# Patient Record
Sex: FEMALE | Race: WHITE | NOT HISPANIC OR LATINO | URBAN - METROPOLITAN AREA
[De-identification: names, ages, dates, MRNs, and addresses within clinical notes are randomized per-mention and may not be internally consistent; named-entity substitution may affect disease eponyms.]

---

## 2019-02-08 ENCOUNTER — OFFICE VISIT (OUTPATIENT)
Dept: URGENT CARE | Facility: CLINIC | Age: 39
End: 2019-02-08
Payer: COMMERCIAL

## 2019-02-08 ENCOUNTER — APPOINTMENT (OUTPATIENT)
Dept: RADIOLOGY | Facility: CLINIC | Age: 39
End: 2019-02-08
Payer: COMMERCIAL

## 2019-02-08 VITALS
DIASTOLIC BLOOD PRESSURE: 78 MMHG | OXYGEN SATURATION: 97 % | WEIGHT: 180 LBS | RESPIRATION RATE: 16 BRPM | HEIGHT: 66 IN | BODY MASS INDEX: 28.93 KG/M2 | SYSTOLIC BLOOD PRESSURE: 132 MMHG | TEMPERATURE: 98.2 F | HEART RATE: 56 BPM
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
## 2019-02-08 DIAGNOSIS — M54.2 NECK PAIN: ICD-10-CM

## 2019-02-08 DIAGNOSIS — M54.2 NECK PAIN: Primary | ICD-10-CM

## 2019-02-08 PROCEDURE — G0382 LEV 3 HOSP TYPE B ED VISIT: HCPCS | Performed by: NURSE PRACTITIONER

## 2019-02-08 PROCEDURE — 72040 X-RAY EXAM NECK SPINE 2-3 VW: CPT

## 2019-02-08 RX ORDER — CYCLOBENZAPRINE HCL 5 MG
5 TABLET ORAL 3 TIMES DAILY PRN
Qty: 30 TABLET | Refills: 0 | Status: SHIPPED | OUTPATIENT
Start: 2019-02-08
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
## 2019-02-08 NOTE — PATIENT INSTRUCTIONS
The xray of your cervical spine looks normal   It does look like your neck is rigid and spasm  We will have the radiologist review the film as well  You may be sore for the next few days  Rest   Take ibuprofen 400 mg to 600 mg every 6 hours as needed for pain  Apply heat to your neck 20 minutes on, 20 minutes off  Follow up with your doctor if your symptoms do not resolve  Take a muscle relaxant as needed for muscle spasm  Do not drive while taking

## 2021-04-08 DIAGNOSIS — Z23 ENCOUNTER FOR IMMUNIZATION: ICD-10-CM

## 2022-03-21 ENCOUNTER — TELEPHONE (OUTPATIENT)
Dept: GASTROENTEROLOGY | Facility: CLINIC | Age: 42
End: 2022-03-21

## 2022-03-21 NOTE — TELEPHONE ENCOUNTER
Pt's , Yina Jacobo, states she is in daily pain; has NP appt 4/11 and is on cancellation list but has daily stomach pain  Primary was going to order CT scan but was denied/asks if GI can order? I noted she would need to be seen first before ordering testing/maybe could get in sooner  # [phone]

## 2022-03-21 NOTE — TELEPHONE ENCOUNTER
Pt's  called back, discussed different options and then an apt opened up for tomorrow  He said would take it and was very grateful  Scheduled with Dr Nela Sabillon at 4:30

## 2022-03-22 ENCOUNTER — TELEPHONE (OUTPATIENT)
Dept: GASTROENTEROLOGY | Facility: CLINIC | Age: 42
End: 2022-03-22

## 2022-03-22 ENCOUNTER — OFFICE VISIT (OUTPATIENT)
Dept: GASTROENTEROLOGY | Facility: CLINIC | Age: 42
End: 2022-03-22
Payer: COMMERCIAL

## 2022-03-22 VITALS
WEIGHT: 180 LBS | HEIGHT: 66 IN | SYSTOLIC BLOOD PRESSURE: 122 MMHG | DIASTOLIC BLOOD PRESSURE: 74 MMHG | BODY MASS INDEX: 28.93 KG/M2

## 2022-03-22 DIAGNOSIS — Z12.11 SCREENING FOR COLON CANCER: ICD-10-CM

## 2022-03-22 DIAGNOSIS — R11.0 NAUSEA: ICD-10-CM

## 2022-03-22 DIAGNOSIS — R10.33 PERIUMBILICAL ABDOMINAL PAIN: ICD-10-CM

## 2022-03-22 DIAGNOSIS — K62.5 RECTAL BLEEDING: Primary | ICD-10-CM

## 2022-03-22 DIAGNOSIS — R10.13 EPIGASTRIC PAIN: ICD-10-CM

## 2022-03-22 DIAGNOSIS — R19.7 DIARRHEA, UNSPECIFIED TYPE: ICD-10-CM

## 2022-03-22 PROCEDURE — 99204 OFFICE O/P NEW MOD 45 MIN: CPT | Performed by: INTERNAL MEDICINE

## 2022-03-22 NOTE — LETTER
March 22, 2022     Ailin Vance MD  [place]    Patient: Manuel Tejeda   YOB: 1980   Date of Visit: 3/22/2022       Dear Dr Maximus Haider: Thank you for referring Manuel Tejeda to me for evaluation  Below are my notes for this consultation  If you have questions, please do not hesitate to call me  I look forward to following your patient along with you  Sincerely,        Marcel Keller MD        CC: No Recipients  Marcel Keller MD  3/22/2022  5:36 PM  Sign when Signing Visit    [facility] - Outpatient Consultation  Manuel Tejeda 43 y o  female MRN: [id number]  Encounter: [id number]    ASSESSMENT AND PLAN:      1  Rectal bleeding  2  Diarrhea, unspecified type  - Clostridium difficile toxin by PCR with EIA; Future  - Giardia antigen; Future  - H  pylori antigen, stool; Future  - Ova and parasite examination; Future  - MISCELLANEOUS LAB TEST; Future  - FECAL LEUKOCYTES; Future  - Fecal fat, qualitative; Future  3  Epigastric pain  4  Periumbilical abdominal pain  5  Nausea  Multitude of GI symptoms including severe periumbilical abdominal pain watery stools and episodes of bloody diarrhea could be secondary to chronic infection, inflammatory bowel disease, given history enteric endometriosis is a consideration  May also have intra-abdominal adhesions contributing to pain and bloating, doubt ischemic bowel  · Diet as tolerated, observe for and avoid any trigger foods including fatty foods caffeine alcohol and mushrooms  · Stool studies as outlined  · Plan EGD and colonoscopy  · If stools negative and endoscopic evaluation nondiagnostic will need small bowel imaging such as MR enterography to assess for Crohn's and possibly endometrial implants    6  Screening for colon cancer  Average risk  Future recommendations for colorectal cancer screening pending results colonoscopy planned above        Followup Appointment: About 2 months pending results of stools and endoscopic evaluation  ______________________________________________________________________    Chief Complaint   Patient presents with    Abdominal Pain     referred by pcp    Rectal Bleeding     with diarrhea gotten worse in the last 30 days    Dizziness       HPI:   Antoine Mora is a 43y o  year old female who presents with bloody stools and abdominal pain  Started with vertigo last December  Last month started having lower back pains and diarrhea  Intermittent  Sometimes constipated  Had a large bloody stool  Has had endometriosis of the bowel  Not vaginal bleeding  Multiple large bloody  And mucus stools  No  Just diarrhea, not bloody  Sometimes just loose, sometimes no stool for 3-4 days and then has hard stool  Has low back pain that radiates to the left abdomen  Kristel Nissen Has bloating and gassiness  Increased anxiety with work past three months  Two months ago, cut out caffeine, was big coffee drinker  Was drinking mushroom water  Now gets intermittent periumbilical crampy sharp abdominal pain  Epigastric pain with nausea, then urgency and has loose to watery stools  After defecation, pain worse in periumbilical area  No fever  No vomiting  Was taking naproxen in past, years ago  Uses medical marijuana which has eliminated need for pain medications and anxiety  No change in medical marijuana regimen over past three years  No recent antibiotics  Historical Information   Past Medical History:   Diagnosis Date    Endometriosis     PTSD (post-traumatic stress disorder)      History reviewed  No pertinent surgical history    Social History     Substance and Sexual Activity   Alcohol Use None    Comment: rarely     Social History     Substance and Sexual Activity   Drug Use Not on file     Social History     Tobacco Use   Smoking Status Never Smoker   Smokeless Tobacco Never Used     Family History   Problem Relation Age of Onset    Hypertension Mother     Colon cancer Neg Hx     Colon polyps Neg Hx     Inflammatory bowel disease Neg Hx     Celiac disease Neg Hx        Meds/Allergies     Current Outpatient Medications:     NON FORMULARY    cyclobenzaprine (FLEXERIL) 5 mg tablet    Allergies   Allergen Reactions    Cephalexin Abdominal Pain and Vomiting       PHYSICAL EXAM:    Blood pressure 122/74, height 5' 6" (1 676 m), weight 81 6 kg (180 lb)  Body mass index is 29 05 kg/m²  General Appearance: NAD, cooperative, alert  Eyes: Anicteric, PERRLA, EOMI  ENT:  Normocephalic, atraumatic, normal mucosa  Neck:  Supple, symmetrical, trachea midline,   Resp:  Clear to auscultation bilaterally; no rales, rhonchi or wheezing; respirations unlabored   CV:  S1 S2, Regular rate and rhythm; no murmur, rub, or gallop  GI:  Soft, mild periumbilical and right lower quadrant tenderness without rebound or guarding, non-distended; normal bowel sounds; no masses, no organomegaly   Rectal: Deferred  Musculoskeletal: No cyanosis, clubbing or edema  Normal ROM  Skin:  No jaundice, rashes, or lesions   Heme/Lymph: No palpable cervical lymphadenopathy  Psych: Normal affect, good eye contact  Neuro: No gross deficits, AAOx3    Lab Results:   CMP, amylase and lipase normal  TSH normal  CBC normal  IBD panel normal  Celiac serologies negative  ESR normal    Radiology Results:   No results found  REVIEW OF SYSTEMS:    CONSTITUTIONAL: Denies any fever, chills, rigors, and weight loss  HEENT: No earache or tinnitus  Denies hearing loss or visual disturbances  CARDIOVASCULAR: No chest pain or palpitations  RESPIRATORY: Denies any cough, hemoptysis, shortness of breath or dyspnea on exertion  GASTROINTESTINAL: As noted in the History of Present Illness  GENITOURINARY: No problems with urination  Denies any hematuria or dysuria  NEUROLOGIC: No dizziness or vertigo, denies headaches  MUSCULOSKELETAL: Denies any muscle or joint pain     SKIN: Denies skin rashes or itching  ENDOCRINE: Denies excessive thirst  Denies intolerance to heat or cold  PSYCHOSOCIAL: Denies depression or anxiety  Denies any recent memory loss

## 2022-03-22 NOTE — PATIENT INSTRUCTIONS
Diet as tolerated, observe for and avoid any trigger foods including fatty foods caffeine alcohol and mushrooms  Check stool studies  Schedule EGD and colonoscopy

## 2022-03-22 NOTE — PROGRESS NOTES
[facility] - Outpatient Consultation  Nolberto Barraza 43 y o  female MRN: [id number]  Encounter: [id number]    ASSESSMENT AND PLAN:      1  Rectal bleeding  2  Diarrhea, unspecified type  - Clostridium difficile toxin by PCR with EIA; Future  - Giardia antigen; Future  - H  pylori antigen, stool; Future  - Ova and parasite examination; Future  - MISCELLANEOUS LAB TEST; Future  - FECAL LEUKOCYTES; Future  - Fecal fat, qualitative; Future  3  Epigastric pain  4  Periumbilical abdominal pain  5  Nausea  Multitude of GI symptoms including severe periumbilical abdominal pain watery stools and episodes of bloody diarrhea could be secondary to chronic infection, inflammatory bowel disease, given history enteric endometriosis is a consideration  May also have intra-abdominal adhesions contributing to pain and bloating, doubt ischemic bowel  · Diet as tolerated, observe for and avoid any trigger foods including fatty foods caffeine alcohol and mushrooms  · Stool studies as outlined  · Plan EGD and colonoscopy  · If stools negative and endoscopic evaluation nondiagnostic will need small bowel imaging such as MR enterography to assess for Crohn's and possibly endometrial implants    6  Screening for colon cancer  Average risk  Future recommendations for colorectal cancer screening pending results colonoscopy planned above  Followup Appointment:  About 2 months pending results of stools and endoscopic evaluation  ______________________________________________________________________    Chief Complaint   Patient presents with    Abdominal Pain     referred by pcp    Rectal Bleeding     with diarrhea gotten worse in the last 30 days    Dizziness       HPI:   Nolberto Barraza is a 43y o  year old female who presents with bloody stools and abdominal pain  Started with vertigo last December  Last month started having lower back pains and diarrhea  Intermittent    Sometimes constipated  Had a large bloody stool  Has had endometriosis of the bowel  Not vaginal bleeding  Multiple large bloody  And mucus stools  No  Just diarrhea, not bloody  Sometimes just loose, sometimes no stool for 3-4 days and then has hard stool  Has low back pain that radiates to the left abdomen  Willy Reeves Has bloating and gassiness  Increased anxiety with work past three months  Two months ago, cut out caffeine, was big coffee drinker  Was drinking mushroom water  Now gets intermittent periumbilical crampy sharp abdominal pain  Epigastric pain with nausea, then urgency and has loose to watery stools  After defecation, pain worse in periumbilical area  No fever  No vomiting  Was taking naproxen in past, years ago  Uses medical marijuana which has eliminated need for pain medications and anxiety  No change in medical marijuana regimen over past three years  No recent antibiotics  Historical Information   Past Medical History:   Diagnosis Date    Endometriosis     PTSD (post-traumatic stress disorder)      History reviewed  No pertinent surgical history  Social History     Substance and Sexual Activity   Alcohol Use None    Comment: rarely     Social History     Substance and Sexual Activity   Drug Use Not on file     Social History     Tobacco Use   Smoking Status Never Smoker   Smokeless Tobacco Never Used     Family History   Problem Relation Age of Onset    Hypertension Mother     Colon cancer Neg Hx     Colon polyps Neg Hx     Inflammatory bowel disease Neg Hx     Celiac disease Neg Hx        Meds/Allergies     Current Outpatient Medications:     NON FORMULARY    cyclobenzaprine (FLEXERIL) 5 mg tablet    Allergies   Allergen Reactions    Cephalexin Abdominal Pain and Vomiting       PHYSICAL EXAM:    Blood pressure 122/74, height 5' 6" (1 676 m), weight 81 6 kg (180 lb)  Body mass index is 29 05 kg/m²    General Appearance: NAD, cooperative, alert  Eyes: Anicteric, PERRLA, EOMI  ENT: Normocephalic, atraumatic, normal mucosa  Neck:  Supple, symmetrical, trachea midline,   Resp:  Clear to auscultation bilaterally; no rales, rhonchi or wheezing; respirations unlabored   CV:  S1 S2, Regular rate and rhythm; no murmur, rub, or gallop  GI:  Soft, mild periumbilical and right lower quadrant tenderness without rebound or guarding, non-distended; normal bowel sounds; no masses, no organomegaly   Rectal: Deferred  Musculoskeletal: No cyanosis, clubbing or edema  Normal ROM  Skin:  No jaundice, rashes, or lesions   Heme/Lymph: No palpable cervical lymphadenopathy  Psych: Normal affect, good eye contact  Neuro: No gross deficits, AAOx3    Lab Results:   CMP, amylase and lipase normal  TSH normal  CBC normal  IBD panel normal  Celiac serologies negative  ESR normal    Radiology Results:   No results found  REVIEW OF SYSTEMS:    CONSTITUTIONAL: Denies any fever, chills, rigors, and weight loss  HEENT: No earache or tinnitus  Denies hearing loss or visual disturbances  CARDIOVASCULAR: No chest pain or palpitations  RESPIRATORY: Denies any cough, hemoptysis, shortness of breath or dyspnea on exertion  GASTROINTESTINAL: As noted in the History of Present Illness  GENITOURINARY: No problems with urination  Denies any hematuria or dysuria  NEUROLOGIC: No dizziness or vertigo, denies headaches  MUSCULOSKELETAL: Denies any muscle or joint pain  SKIN: Denies skin rashes or itching  ENDOCRINE: Denies excessive thirst  Denies intolerance to heat or cold  PSYCHOSOCIAL: Denies depression or anxiety  Denies any recent memory loss

## 2022-03-22 NOTE — TELEPHONE ENCOUNTER
Pt with scheduled for combo 5/9/22 with Dr Rigoberto Marcelino in Endo   Stool studies were ordered  jg

## 2022-03-23 DIAGNOSIS — K62.5 RECTAL BLEEDING: Primary | ICD-10-CM

## 2022-03-23 NOTE — TELEPHONE ENCOUNTER
Scheduled date of colonoscopy (as of today):5/9/22  Physician performing colonoscopy:Dr Axel Suarez  Location of colonoscopy:Endo  Bowel prep reviewed with patient:Clenpiq  Instructions reviewed with patient by: Darren Jay  Clearances: No

## 2022-03-24 RX ORDER — SODIUM PICOSULFATE, MAGNESIUM OXIDE, AND ANHYDROUS CITRIC ACID 10; 3.5; 12 MG/160ML; G/160ML; G/160ML
LIQUID ORAL
Qty: 320 ML | Refills: 0 | Status: SHIPPED | OUTPATIENT
Start: 2022-03-24

## 2022-04-20 NOTE — TELEPHONE ENCOUNTER
Spoke to patient  All of her symptoms resolved  No pain, no diarrhea, no vertigo, etc  She also stopped the mushrooms and is wondering if had something to do with symptoms  She states if colonoscopy still recommended she would proceed but asked that I check with KK

## 2023-01-27 ENCOUNTER — TELEPHONE (OUTPATIENT)
Dept: GASTROENTEROLOGY | Facility: CLINIC | Age: 43
End: 2023-01-27

## 2023-01-27 ENCOUNTER — OFFICE VISIT (OUTPATIENT)
Dept: GASTROENTEROLOGY | Facility: CLINIC | Age: 43
End: 2023-01-27

## 2023-01-27 VITALS
HEIGHT: 66 IN | DIASTOLIC BLOOD PRESSURE: 70 MMHG | WEIGHT: 181 LBS | BODY MASS INDEX: 29.09 KG/M2 | SYSTOLIC BLOOD PRESSURE: 112 MMHG

## 2023-01-27 DIAGNOSIS — K62.5 RECTAL BLEEDING: Primary | ICD-10-CM

## 2023-01-27 DIAGNOSIS — R19.7 DIARRHEA, UNSPECIFIED TYPE: ICD-10-CM

## 2023-01-27 DIAGNOSIS — R10.84 GENERALIZED ABDOMINAL PAIN: ICD-10-CM

## 2023-01-27 DIAGNOSIS — N80.9 ENDOMETRIOSIS: ICD-10-CM

## 2023-01-27 RX ORDER — ELAGOLIX 150 MG/1
TABLET, FILM COATED ORAL
COMMUNITY

## 2023-01-27 NOTE — PROGRESS NOTES
[facility] - Outpatient Follow-up Note  Antonio Campos 37 y o  female MRN: [id number]  Encounter: [id number]    ASSESSMENT AND PLAN:      1  Rectal bleeding  2  Diarrhea, unspecified type  3  Endometriosis  4  Generalized abdominal pain  Ongoing issues with stool frequency, tenesmus, and occasional rectal bleeding  History of endometriosis and going further work-up with her gynecologist   Some improvement in pain with Orilissa, but stool abnormalities persist     Discussed treatment options  We will plan EGD/colonoscopy to assess for endometrial implants, as well as to assess for alternate diagnoses like celiac disease, inflammatory bowel disease, or microscopic colitis  In the interim, okay to use IBgard for symptomatic relief of abdominal pain  - EGD; Future  - Colonoscopy; Future      Followup Appointment: Pending EGD/colonoscopy  ______________________________________________________________________    Chief Complaint   Patient presents with   • referred by OB/gyn for GI evaluation     HPI: The patient presents for follow-up on multiple GI complaints  She was seen by Dr Anish Olsen last year with similar complaints that improved with some dietary changes  Lately she has been undergoing GYN work-up for known history of endometriosis that may be progressing  She was started on Orilissa with some improvement in pain but not in other GI complaints  The eventual plan includes possible hysterectomy  She was diagnosed with endometriosis in 2006 when she had surgery for an ovarian cyst   Bowel involvement was reportedly noted  She had a colonoscopy around then but results are not available to me  She had a son in 2007 and was relatively asymptomatic for 2 or 3 years  She then had a daughter about 8 years ago and had relief for a few years but she has been gradually getting worse    From the GI standpoint she has generalized abdominal discomfort, frequent stools with incomplete evacuation  She has multiple trips to the bathroom for stools  Bouts could last for 5 days  She occasionally sees blood  With the new medication she is had less GI pain but the other symptoms persist   She has occasional nausea that is associated with vertigo  She has not tried antidiarrheals  She is use Gas-X as needed for bloating  She uses Pepto for nausea    Historical Information   Past Medical History:   Diagnosis Date   • Endometriosis    • PTSD (post-traumatic stress disorder)      No past surgical history on file  Social History     Substance and Sexual Activity   Alcohol Use None    Comment: rarely     Social History     Substance and Sexual Activity   Drug Use Not on file     Social History     Tobacco Use   Smoking Status Never   Smokeless Tobacco Never     Family History   Problem Relation Age of Onset   • Hypertension Mother    • Colon cancer Neg Hx    • Colon polyps Neg Hx    • Inflammatory bowel disease Neg Hx    • Celiac disease Neg Hx          Current Outpatient Medications:   •  Elagolix Sodium (Orilissa) 150 MG TABS  •  NON FORMULARY  •  cyclobenzaprine (FLEXERIL) 5 mg tablet  •  Sod Picosulfate-Mag Ox-Cit Acd (Clenpiq) 10-3 5-12 MG-GM -GM/160ML SOLN  Allergies   Allergen Reactions   • Cephalexin Abdominal Pain and Vomiting     Reviewed medications and allergies and updated as indicated    PHYSICAL EXAM:    Blood pressure 112/70, height 5' 6" (1 676 m), weight 82 1 kg (181 lb)  Body mass index is 29 21 kg/m²  General Appearance: NAD, cooperative, alert  Eyes: Anicteric, PERRLA, EOMI  ENT:  Normocephalic, atraumatic, normal mucosa  Neck:  Supple, symmetrical, trachea midline  Resp:  Clear to auscultation bilaterally; no rales, rhonchi or wheezing; respirations unlabored   CV:  S1 S2, Regular rate and rhythm; no murmur, rub, or gallop    GI:  Soft, non-tender, non-distended; normal bowel sounds; no masses, no organomegaly   Rectal: Deferred  Musculoskeletal: No cyanosis, clubbing or edema  Normal ROM  Skin:  No jaundice, rashes, or lesions   Heme/Lymph: No palpable cervical lymphadenopathy  Psych: Normal affect, good eye contact  Neuro: No gross deficits, AAOx3    Lab Results:   No results found for: WBC, HGB, HCT, MCV, PLT  No results found for: NA, K, CL, CO2, ANIONGAP, BUN, CREATININE, GLUCOSE, GLUF, CALCIUM, CORRECTEDCA, AST, ALT, ALKPHOS, PROT, BILITOT, EGFR  No results found for: IRON, TIBC, FERRITIN  No results found for: LIPASE    Radiology Results:   No results found

## 2023-01-27 NOTE — H&P (VIEW-ONLY)
[id number] - Outpatient Follow-up Note  Ilana Monroe 37 y o  female MRN: [id number]  Encounter: [id number]    ASSESSMENT AND PLAN:      1  Rectal bleeding  2  Diarrhea, unspecified type  3  Endometriosis  4  Generalized abdominal pain  Ongoing issues with stool frequency, tenesmus, and occasional rectal bleeding  History of endometriosis and going further work-up with her gynecologist   Some improvement in pain with Orilissa, but stool abnormalities persist     Discussed treatment options  We will plan EGD/colonoscopy to assess for endometrial implants, as well as to assess for alternate diagnoses like celiac disease, inflammatory bowel disease, or microscopic colitis  In the interim, okay to use IBgard for symptomatic relief of abdominal pain  - EGD; Future  - Colonoscopy; Future      Followup Appointment: Pending EGD/colonoscopy  ______________________________________________________________________    Chief Complaint   Patient presents with   • referred by OB/gyn for GI evaluation     HPI: The patient presents for follow-up on multiple GI complaints  She was seen by Dr Vilma Jacobs last year with similar complaints that improved with some dietary changes  Lately she has been undergoing GYN work-up for known history of endometriosis that may be progressing  She was started on Orilissa with some improvement in pain but not in other GI complaints  The eventual plan includes possible hysterectomy  She was diagnosed with endometriosis in 2006 when she had surgery for an ovarian cyst   Bowel involvement was reportedly noted  She had a colonoscopy around then but results are not available to me  She had a son in 2007 and was relatively asymptomatic for 2 or 3 years  She then had a daughter about 8 years ago and had relief for a few years but she has been gradually getting worse    From the GI standpoint she has generalized abdominal discomfort, frequent stools with incomplete evacuation  She has multiple trips to the bathroom for stools  Bouts could last for 5 days  She occasionally sees blood  With the new medication she is had less GI pain but the other symptoms persist   She has occasional nausea that is associated with vertigo  She has not tried antidiarrheals  She is use Gas-X as needed for bloating  She uses Pepto for nausea    Historical Information   Past Medical History:   Diagnosis Date   • Endometriosis    • PTSD (post-traumatic stress disorder)      No past surgical history on file  Social History     Substance and Sexual Activity   Alcohol Use None    Comment: rarely     Social History     Substance and Sexual Activity   Drug Use Not on file     Social History     Tobacco Use   Smoking Status Never   Smokeless Tobacco Never     Family History   Problem Relation Age of Onset   • Hypertension Mother    • Colon cancer Neg Hx    • Colon polyps Neg Hx    • Inflammatory bowel disease Neg Hx    • Celiac disease Neg Hx          Current Outpatient Medications:   •  Elagolix Sodium (Orilissa) 150 MG TABS  •  NON FORMULARY  •  cyclobenzaprine (FLEXERIL) 5 mg tablet  •  Sod Picosulfate-Mag Ox-Cit Acd (Clenpiq) 10-3 5-12 MG-GM -GM/160ML SOLN  Allergies   Allergen Reactions   • Cephalexin Abdominal Pain and Vomiting     Reviewed medications and allergies and updated as indicated    PHYSICAL EXAM:    Blood pressure 112/70, height 5' 6" (1 676 m), weight 82 1 kg (181 lb)  Body mass index is 29 21 kg/m²  General Appearance: NAD, cooperative, alert  Eyes: Anicteric, PERRLA, EOMI  ENT:  Normocephalic, atraumatic, normal mucosa  Neck:  Supple, symmetrical, trachea midline  Resp:  Clear to auscultation bilaterally; no rales, rhonchi or wheezing; respirations unlabored   CV:  S1 S2, Regular rate and rhythm; no murmur, rub, or gallop    GI:  Soft, non-tender, non-distended; normal bowel sounds; no masses, no organomegaly   Rectal: Deferred  Musculoskeletal: No cyanosis, clubbing or edema  Normal ROM  Skin:  No jaundice, rashes, or lesions   Heme/Lymph: No palpable cervical lymphadenopathy  Psych: Normal affect, good eye contact  Neuro: No gross deficits, AAOx3    Lab Results:   No results found for: WBC, HGB, HCT, MCV, PLT  No results found for: NA, K, CL, CO2, ANIONGAP, BUN, CREATININE, GLUCOSE, GLUF, CALCIUM, CORRECTEDCA, AST, ALT, ALKPHOS, PROT, BILITOT, EGFR  No results found for: IRON, TIBC, FERRITIN  No results found for: LIPASE    Radiology Results:   No results found

## 2023-01-27 NOTE — TELEPHONE ENCOUNTER
Scheduled date of EGD/colonoscopy (as of today):2/9/23  Physician performing EGD/colonoscopy: Dr Manzo Linear  Location of EGD/colonoscopy: [facility] ([facility])  Desired bowel prep reviewed with patient: Miralax/dulcolax  Instructions reviewed with patient by: gave patient packet  Clearances: N

## 2023-02-09 ENCOUNTER — ANESTHESIA (OUTPATIENT)
Dept: GASTROENTEROLOGY | Facility: AMBULATORY SURGERY CENTER | Age: 43
End: 2023-02-09

## 2023-02-09 ENCOUNTER — ANESTHESIA EVENT (OUTPATIENT)
Dept: GASTROENTEROLOGY | Facility: AMBULATORY SURGERY CENTER | Age: 43
End: 2023-02-09

## 2023-02-09 ENCOUNTER — HOSPITAL ENCOUNTER (OUTPATIENT)
Dept: GASTROENTEROLOGY | Facility: AMBULATORY SURGERY CENTER | Age: 43
Discharge: HOME | End: 2023-02-09

## 2023-02-09 VITALS
BODY MASS INDEX: 28.45 KG/M2 | HEART RATE: 58 BPM | RESPIRATION RATE: 18 BRPM | HEIGHT: 66 IN | OXYGEN SATURATION: 100 % | TEMPERATURE: 98.8 F | SYSTOLIC BLOOD PRESSURE: 105 MMHG | DIASTOLIC BLOOD PRESSURE: 75 MMHG | WEIGHT: 177 LBS

## 2023-02-09 DIAGNOSIS — R10.84 GENERALIZED ABDOMINAL PAIN: ICD-10-CM

## 2023-02-09 DIAGNOSIS — R19.7 DIARRHEA, UNSPECIFIED TYPE: ICD-10-CM

## 2023-02-09 DIAGNOSIS — N80.9 ENDOMETRIOSIS: ICD-10-CM

## 2023-02-09 DIAGNOSIS — K62.5 RECTAL BLEEDING: ICD-10-CM

## 2023-02-09 RX ORDER — PROPOFOL 10 MG/ML
INJECTION, EMULSION INTRAVENOUS AS NEEDED
Status: DISCONTINUED | OUTPATIENT
Start: 2023-02-09 | End: 2023-02-09

## 2023-02-09 RX ORDER — LIDOCAINE HYDROCHLORIDE 10 MG/ML
INJECTION, SOLUTION EPIDURAL; INFILTRATION; INTRACAUDAL; PERINEURAL AS NEEDED
Status: DISCONTINUED | OUTPATIENT
Start: 2023-02-09 | End: 2023-02-09

## 2023-02-09 RX ORDER — SODIUM CHLORIDE, SODIUM LACTATE, POTASSIUM CHLORIDE, CALCIUM CHLORIDE 600; 310; 30; 20 MG/100ML; MG/100ML; MG/100ML; MG/100ML
50 INJECTION, SOLUTION INTRAVENOUS CONTINUOUS
Status: DISCONTINUED | OUTPATIENT
Start: 2023-02-09 | End: 2023-02-13 | Stop reason: HOSPADM

## 2023-02-09 RX ADMIN — PROPOFOL 30 MG: 10 INJECTION, EMULSION INTRAVENOUS at 08:45

## 2023-02-09 RX ADMIN — LIDOCAINE HYDROCHLORIDE 50 MG: 10 INJECTION, SOLUTION EPIDURAL; INFILTRATION; INTRACAUDAL; PERINEURAL at 08:31

## 2023-02-09 RX ADMIN — PROPOFOL 200 MG: 10 INJECTION, EMULSION INTRAVENOUS at 08:31

## 2023-02-09 RX ADMIN — PROPOFOL 30 MG: 10 INJECTION, EMULSION INTRAVENOUS at 08:36

## 2023-02-09 RX ADMIN — PROPOFOL 30 MG: 10 INJECTION, EMULSION INTRAVENOUS at 08:53

## 2023-02-09 RX ADMIN — PROPOFOL 20 MG: 10 INJECTION, EMULSION INTRAVENOUS at 08:41

## 2023-02-09 RX ADMIN — PROPOFOL 30 MG: 10 INJECTION, EMULSION INTRAVENOUS at 08:49

## 2023-02-09 RX ADMIN — SODIUM CHLORIDE, SODIUM LACTATE, POTASSIUM CHLORIDE, CALCIUM CHLORIDE 50 ML/HR: 600; 310; 30; 20 INJECTION, SOLUTION INTRAVENOUS at 08:25

## 2023-02-09 NOTE — ANESTHESIA POSTPROCEDURE EVALUATION
Post-Op Assessment Note    CV Status:  Stable  Pain Score: 0    Pain management: adequate     Mental Status:  Alert, awake and sleepy   Hydration Status:  Euvolemic   PONV Controlled:  None   Airway Patency:  Patent      Post Op Vitals Reviewed: Yes      Staff: Anesthesiologist, CRNA   Comments: report given to RN; VSS; RA        No notable events documented      BP   98/55   Temp      Pulse  56   Resp   18   SpO2   97

## 2023-02-09 NOTE — QUICK NOTE
Dr Trent Rodriguez with pt  bedside gave pt her discharge instructions  Pt c/o nausea, Dr Trent Rodriguez aware    Pt discussed taking medical marijuana with Dr Timothy Ivy

## 2023-02-09 NOTE — INTERVAL H&P NOTE
H&P reviewed  After examining the patient I find no changes in the patients condition since the H&P had been written      Vitals:    02/09/23 0815   BP: 122/66   Pulse: 57   Resp: 21   Temp:    SpO2: 99%

## 2023-02-09 NOTE — ANESTHESIA PREPROCEDURE EVALUATION
Procedure:  EGD  COLONOSCOPY    Relevant Problems   NEURO/PSYCH   (+) Anxiety   (+) PTSD (post-traumatic stress disorder)      Other   (+) Endometriosis        Physical Exam    Airway    Mallampati score: II  TM Distance: >3 FB  Neck ROM: full     Dental   No notable dental hx     Cardiovascular      Pulmonary      Other Findings        Anesthesia Plan  ASA Score- 2     Anesthesia Type- IV sedation with anesthesia with ASA Monitors  Additional Monitors:   Airway Plan:           Plan Factors-Exercise tolerance (METS): >4 METS  Chart reviewed  Patient summary reviewed  Patient is not a current smoker  Induction- intravenous  Postoperative Plan-     Informed Consent- Anesthetic plan and risks discussed with patient  I personally reviewed this patient with the CRNA  Discussed and agreed on the Anesthesia Plan with the CRNA  Roxana Gaytan

## 2023-02-17 ENCOUNTER — PATIENT MESSAGE (OUTPATIENT)
Dept: GASTROENTEROLOGY | Facility: CLINIC | Age: 43
End: 2023-02-17

## 2023-02-17 NOTE — RESULT ENCOUNTER NOTE
Discussed with patient, 5 year colonoscopy recall and no EGD recall  Reviewed her portal encounter as well  She is having rectal pain and bleeding believes it is due to hemorrhoids  I recommended hydrocortisone cream   She will update me via the portal early next week    We discussed CT or small bowel imaging if GI symptoms persist

## 2023-09-22 ENCOUNTER — HOSPITAL ENCOUNTER (EMERGENCY)
Facility: HOSPITAL | Age: 43
Discharge: HOME | End: 2023-09-22
Attending: EMERGENCY MEDICINE
Payer: COMMERCIAL

## 2023-09-22 ENCOUNTER — APPOINTMENT (EMERGENCY)
Dept: RADIOLOGY | Facility: HOSPITAL | Age: 43
End: 2023-09-22
Payer: COMMERCIAL

## 2023-09-22 VITALS
BODY MASS INDEX: 28.34 KG/M2 | HEART RATE: 61 BPM | RESPIRATION RATE: 20 BRPM | SYSTOLIC BLOOD PRESSURE: 120 MMHG | DIASTOLIC BLOOD PRESSURE: 74 MMHG | TEMPERATURE: 98.4 F | HEIGHT: 66 IN | WEIGHT: 176.37 LBS | OXYGEN SATURATION: 99 %

## 2023-09-22 DIAGNOSIS — I47.10 SVT (SUPRAVENTRICULAR TACHYCARDIA): Primary | ICD-10-CM

## 2023-09-22 LAB
ALBUMIN SERPL BCP-MCNC: 4.9 G/DL (ref 3.5–5)
ALP SERPL-CCNC: 37 U/L (ref 34–104)
ALT SERPL W P-5'-P-CCNC: 10 U/L (ref 7–52)
ANION GAP SERPL CALCULATED.3IONS-SCNC: 9 MMOL/L
AST SERPL W P-5'-P-CCNC: 21 U/L (ref 13–39)
ATRIAL RATE: 78 BPM
BASOPHILS # BLD AUTO: 0.05 THOUSANDS/ÂΜL (ref 0–0.1)
BASOPHILS NFR BLD AUTO: 1 % (ref 0–1)
BILIRUB SERPL-MCNC: 0.66 MG/DL (ref 0.2–1)
BUN SERPL-MCNC: 14 MG/DL (ref 5–25)
CALCIUM SERPL-MCNC: 9.7 MG/DL (ref 8.4–10.2)
CARDIAC TROPONIN I PNL SERPL HS: 2 NG/L
CHLORIDE SERPL-SCNC: 105 MMOL/L (ref 96–108)
CO2 SERPL-SCNC: 22 MMOL/L (ref 21–32)
CREAT SERPL-MCNC: 0.94 MG/DL (ref 0.6–1.3)
EOSINOPHIL # BLD AUTO: 0.12 THOUSAND/ÂΜL (ref 0–0.61)
EOSINOPHIL NFR BLD AUTO: 2 % (ref 0–6)
ERYTHROCYTE [DISTWIDTH] IN BLOOD BY AUTOMATED COUNT: 13.2 % (ref 11.6–15.1)
GFR SERPL CREATININE-BSD FRML MDRD: 74 ML/MIN/1.73SQ M
GLUCOSE SERPL-MCNC: 126 MG/DL (ref 65–140)
HCT VFR BLD AUTO: 41.5 % (ref 34.8–46.1)
HGB BLD-MCNC: 13.5 G/DL (ref 11.5–15.4)
IMM GRANULOCYTES # BLD AUTO: 0.03 THOUSAND/UL (ref 0–0.2)
IMM GRANULOCYTES NFR BLD AUTO: 0 % (ref 0–2)
LYMPHOCYTES # BLD AUTO: 2.65 THOUSANDS/ÂΜL (ref 0.6–4.47)
LYMPHOCYTES NFR BLD AUTO: 35 % (ref 14–44)
MCH RBC QN AUTO: 31.1 PG (ref 26.8–34.3)
MCHC RBC AUTO-ENTMCNC: 32.5 G/DL (ref 31.4–37.4)
MCV RBC AUTO: 96 FL (ref 82–98)
MONOCYTES # BLD AUTO: 0.53 THOUSAND/ÂΜL (ref 0.17–1.22)
MONOCYTES NFR BLD AUTO: 7 % (ref 4–12)
NEUTROPHILS # BLD AUTO: 4.31 THOUSANDS/ÂΜL (ref 1.85–7.62)
NEUTS SEG NFR BLD AUTO: 55 % (ref 43–75)
NRBC BLD AUTO-RTO: 0 /100 WBCS
P AXIS: 66 DEGREES
PLATELET # BLD AUTO: 292 THOUSANDS/UL (ref 149–390)
PMV BLD AUTO: 10.3 FL (ref 8.9–12.7)
POTASSIUM SERPL-SCNC: 3.7 MMOL/L (ref 3.5–5.3)
PR INTERVAL: 150 MS
PROT SERPL-MCNC: 8 G/DL (ref 6.4–8.4)
QRS AXIS: 80 DEGREES
QRSD INTERVAL: 78 MS
QT INTERVAL: 352 MS
QTC INTERVAL: 401 MS
RBC # BLD AUTO: 4.34 MILLION/UL (ref 3.81–5.12)
SODIUM SERPL-SCNC: 136 MMOL/L (ref 135–147)
T WAVE AXIS: 63 DEGREES
VENTRICULAR RATE: 78 BPM
WBC # BLD AUTO: 7.69 THOUSAND/UL (ref 4.31–10.16)

## 2023-09-22 PROCEDURE — 99291 CRITICAL CARE FIRST HOUR: CPT | Performed by: EMERGENCY MEDICINE

## 2023-09-22 PROCEDURE — 85025 COMPLETE CBC W/AUTO DIFF WBC: CPT | Performed by: EMERGENCY MEDICINE

## 2023-09-22 PROCEDURE — 93005 ELECTROCARDIOGRAM TRACING: CPT

## 2023-09-22 PROCEDURE — 71045 X-RAY EXAM CHEST 1 VIEW: CPT

## 2023-09-22 PROCEDURE — 36415 COLL VENOUS BLD VENIPUNCTURE: CPT

## 2023-09-22 PROCEDURE — 99285 EMERGENCY DEPT VISIT HI MDM: CPT

## 2023-09-22 PROCEDURE — 93010 ELECTROCARDIOGRAM REPORT: CPT | Performed by: INTERNAL MEDICINE

## 2023-09-22 PROCEDURE — 84484 ASSAY OF TROPONIN QUANT: CPT | Performed by: EMERGENCY MEDICINE

## 2023-09-22 PROCEDURE — 80053 COMPREHEN METABOLIC PANEL: CPT | Performed by: EMERGENCY MEDICINE

## 2023-09-22 RX ORDER — METOPROLOL SUCCINATE 25 MG/1
25 TABLET, EXTENDED RELEASE ORAL DAILY
Qty: 20 TABLET | Refills: 0 | Status: SHIPPED | OUTPATIENT
Start: 2023-09-22

## 2023-09-22 RX ORDER — ADENOSINE 3 MG/ML
INJECTION, SOLUTION INTRAVENOUS
Status: COMPLETED
Start: 2023-09-22 | End: 2023-09-22

## 2023-09-22 RX ORDER — METOPROLOL SUCCINATE 25 MG/1
25 TABLET, EXTENDED RELEASE ORAL DAILY
Status: DISCONTINUED | OUTPATIENT
Start: 2023-09-22 | End: 2023-09-22 | Stop reason: HOSPADM

## 2023-09-22 RX ADMIN — ADENOSINE: 3 INJECTION, SOLUTION INTRAVENOUS at 09:21

## 2023-09-22 RX ADMIN — METOPROLOL SUCCINATE 25 MG: 25 TABLET, EXTENDED RELEASE ORAL at 12:07

## 2024-08-31 ENCOUNTER — HOSPITAL ENCOUNTER (EMERGENCY)
Facility: HOSPITAL | Age: 44
Discharge: HOME | End: 2024-08-31
Attending: EMERGENCY MEDICINE
Payer: COMMERCIAL

## 2024-08-31 VITALS
HEART RATE: 66 BPM | OXYGEN SATURATION: 100 % | TEMPERATURE: 96.8 F | RESPIRATION RATE: 18 BRPM | SYSTOLIC BLOOD PRESSURE: 105 MMHG | DIASTOLIC BLOOD PRESSURE: 68 MMHG

## 2024-08-31 DIAGNOSIS — I47.10 SVT (SUPRAVENTRICULAR TACHYCARDIA): Primary | ICD-10-CM

## 2024-08-31 LAB
ANION GAP SERPL CALCULATED.3IONS-SCNC: 11 MMOL/L (ref 4–13)
ATRIAL RATE: 197 BPM
ATRIAL RATE: 75 BPM
BASOPHILS # BLD AUTO: 0.05 THOUSANDS/ÂΜL (ref 0–0.1)
BASOPHILS NFR BLD AUTO: 1 % (ref 0–1)
BUN SERPL-MCNC: 14 MG/DL (ref 5–25)
CALCIUM SERPL-MCNC: 9.2 MG/DL (ref 8.4–10.2)
CARDIAC TROPONIN I PNL SERPL HS: 3 NG/L
CHLORIDE SERPL-SCNC: 107 MMOL/L (ref 96–108)
CO2 SERPL-SCNC: 23 MMOL/L (ref 21–32)
CREAT SERPL-MCNC: 0.82 MG/DL (ref 0.6–1.3)
EOSINOPHIL # BLD AUTO: 0.1 THOUSAND/ÂΜL (ref 0–0.61)
EOSINOPHIL NFR BLD AUTO: 1 % (ref 0–6)
ERYTHROCYTE [DISTWIDTH] IN BLOOD BY AUTOMATED COUNT: 12.7 % (ref 11.6–15.1)
GLUCOSE SERPL-MCNC: 111 MG/DL (ref 65–140)
HCG SERPL QL: NEGATIVE
HCT VFR BLD AUTO: 39.6 % (ref 36.5–46.1)
HGB BLD-MCNC: 13.6 G/DL (ref 12–15.4)
IMM GRANULOCYTES # BLD AUTO: 0.04 THOUSAND/UL (ref 0–0.2)
IMM GRANULOCYTES NFR BLD AUTO: 1 % (ref 0–2)
LYMPHOCYTES # BLD AUTO: 2.33 THOUSANDS/ÂΜL (ref 0.6–4.47)
LYMPHOCYTES NFR BLD AUTO: 31 % (ref 14–44)
MCH RBC QN AUTO: 33.4 PG (ref 26.8–34.3)
MCHC RBC AUTO-ENTMCNC: 34.3 G/DL (ref 31.4–37.4)
MCV RBC AUTO: 97 FL (ref 82–98)
MONOCYTES # BLD AUTO: 0.59 THOUSAND/ÂΜL (ref 0.17–1.22)
MONOCYTES NFR BLD AUTO: 8 % (ref 4–12)
NEUTROPHILS # BLD AUTO: 4.47 THOUSANDS/ÂΜL (ref 1.85–7.62)
NEUTS SEG NFR BLD AUTO: 58 % (ref 43–75)
NRBC BLD AUTO-RTO: 0 /100 WBCS
P AXIS: 67 DEGREES
PLATELET # BLD AUTO: 278 THOUSANDS/UL (ref 149–390)
PMV BLD AUTO: 10 FL (ref 8.9–12.7)
POTASSIUM SERPL-SCNC: 3.6 MMOL/L (ref 3.5–5.3)
PR INTERVAL: 184 MS
QRS AXIS: 69 DEGREES
QRS AXIS: 83 DEGREES
QRSD INTERVAL: 78 MS
QRSD INTERVAL: 96 MS
QT INTERVAL: 230 MS
QT INTERVAL: 382 MS
QTC INTERVAL: 417 MS
QTC INTERVAL: 426 MS
RBC # BLD AUTO: 4.07 MILLION/UL (ref 3.88–5.12)
SODIUM SERPL-SCNC: 141 MMOL/L (ref 135–147)
T WAVE AXIS: 15 DEGREES
T WAVE AXIS: 42 DEGREES
TSH SERPL DL<=0.05 MIU/L-ACNC: 1.73 UIU/ML (ref 0.45–4.5)
VENTRICULAR RATE: 198 BPM
VENTRICULAR RATE: 75 BPM
WBC # BLD AUTO: 7.58 THOUSAND/UL (ref 4.31–10.16)

## 2024-08-31 PROCEDURE — 99291 CRITICAL CARE FIRST HOUR: CPT | Performed by: EMERGENCY MEDICINE

## 2024-08-31 PROCEDURE — 99285 EMERGENCY DEPT VISIT HI MDM: CPT

## 2024-08-31 PROCEDURE — 93005 ELECTROCARDIOGRAM TRACING: CPT

## 2024-08-31 PROCEDURE — 85025 COMPLETE CBC W/AUTO DIFF WBC: CPT | Performed by: EMERGENCY MEDICINE

## 2024-08-31 PROCEDURE — 36415 COLL VENOUS BLD VENIPUNCTURE: CPT | Performed by: EMERGENCY MEDICINE

## 2024-08-31 PROCEDURE — 84443 ASSAY THYROID STIM HORMONE: CPT | Performed by: EMERGENCY MEDICINE

## 2024-08-31 PROCEDURE — 93010 ELECTROCARDIOGRAM REPORT: CPT | Performed by: INTERNAL MEDICINE

## 2024-08-31 PROCEDURE — 80048 BASIC METABOLIC PNL TOTAL CA: CPT | Performed by: EMERGENCY MEDICINE

## 2024-08-31 PROCEDURE — 84484 ASSAY OF TROPONIN QUANT: CPT | Performed by: EMERGENCY MEDICINE

## 2024-08-31 PROCEDURE — 84703 CHORIONIC GONADOTROPIN ASSAY: CPT | Performed by: EMERGENCY MEDICINE

## 2024-08-31 PROCEDURE — 96374 THER/PROPH/DIAG INJ IV PUSH: CPT

## 2024-08-31 PROCEDURE — 96361 HYDRATE IV INFUSION ADD-ON: CPT

## 2024-08-31 RX ORDER — DILTIAZEM HYDROCHLORIDE 5 MG/ML
0.25 INJECTION INTRAVENOUS ONCE
Status: COMPLETED | OUTPATIENT
Start: 2024-08-31 | End: 2024-08-31

## 2024-08-31 RX ADMIN — DILTIAZEM HYDROCHLORIDE 20 MG: 5 INJECTION, SOLUTION INTRAVENOUS at 10:55

## 2024-08-31 RX ADMIN — SODIUM CHLORIDE 1000 ML: 0.9 INJECTION, SOLUTION INTRAVENOUS at 11:09

## 2024-08-31 NOTE — ED PROVIDER NOTES
History  Chief Complaint   Patient presents with    Rapid Heart Rate     Pt reports in SVT.      44-year-old female with a prior history of SVT presents for evaluation of palpitations that started 1 hour prior to arrival.  Patient takes Cardizem daily at night and has been compliant with her medications.  Patient denies anything out of the ordinary this morning.  She reports she was packing to go camping and also had her regular 6 ounces of coffee which is unchanged.  Reports sudden onset of heart racing and Fitbit reported her heart rate to be in the 200s.        Prior to Admission Medications   Prescriptions Last Dose Informant Patient Reported? Taking?   Elagolix Sodium (Orilissa) 150 MG TABS   Yes No   Sig: Take by mouth One daily   NON FORMULARY  Self Yes No   Sig: Medical Marijuana   Sod Picosulfate-Mag Ox-Cit Acd (Clenpiq) 10-3.5-12 MG-GM -GM/160ML SOLN   No No   Sig: As directed (1 kit)   Patient not taking: Reported on 2023   cyclobenzaprine (FLEXERIL) 5 mg tablet  Self No No   Sig: Take 1 tablet (5 mg total) by mouth 3 (three) times a day as needed for muscle spasms   Patient not taking: Reported on 3/22/2022    metoprolol succinate (TOPROL-XL) 25 mg 24 hr tablet   No No   Sig: Take 1 tablet (25 mg total) by mouth daily      Facility-Administered Medications: None       Past Medical History:   Diagnosis Date    Anxiety     Endometriosis     PTSD (post-traumatic stress disorder)        Past Surgical History:   Procedure Laterality Date     SECTION      LAPAROSCOPY      TUBAL LIGATION         Family History   Problem Relation Age of Onset    Hypertension Mother     Colon cancer Neg Hx     Colon polyps Neg Hx     Inflammatory bowel disease Neg Hx     Celiac disease Neg Hx      I have reviewed and agree with the history as documented.    E-Cigarette/Vaping    E-Cigarette Use Never User      E-Cigarette/Vaping Substances    Nicotine No     THC No     CBD No     Flavoring No     Other No     Unknown  No      Social History     Tobacco Use    Smoking status: Never    Smokeless tobacco: Never   Vaping Use    Vaping status: Never Used   Substance Use Topics    Drug use: Yes     Types: Marijuana     Comment: medical marijuana       Review of Systems   Cardiovascular:  Positive for palpitations.       Physical Exam  Physical Exam  Vitals and nursing note reviewed.   Constitutional:       General: She is not in acute distress.     Appearance: She is well-developed.   HENT:      Head: Normocephalic and atraumatic.      Right Ear: External ear normal.      Left Ear: External ear normal.      Nose: Nose normal.   Eyes:      General: No scleral icterus.  Cardiovascular:      Rate and Rhythm: Tachycardia present.   Pulmonary:      Effort: Pulmonary effort is normal. No respiratory distress.   Abdominal:      General: There is no distension.      Palpations: Abdomen is soft.   Musculoskeletal:         General: No deformity. Normal range of motion.      Cervical back: Normal range of motion and neck supple.   Skin:     General: Skin is warm.      Findings: No rash.   Neurological:      General: No focal deficit present.      Mental Status: She is alert.      Gait: Gait normal.   Psychiatric:         Mood and Affect: Mood normal.         Vital Signs  ED Triage Vitals [08/31/24 1050]   Temperature Pulse Respirations Blood Pressure SpO2   (!) 96.8 °F (36 °C) (!) 218 18 102/58 100 %      Temp Source Heart Rate Source Patient Position - Orthostatic VS BP Location FiO2 (%)   Temporal Monitor Sitting Left arm --      Pain Score       --           Vitals:    08/31/24 1106 08/31/24 1115 08/31/24 1145 08/31/24 1200   BP:  103/62 105/69 105/68   Pulse: 77 71 68 66   Patient Position - Orthostatic VS:             Visual Acuity      ED Medications  Medications   diltiazem (CARDIZEM) injection 20 mg (20 mg Intravenous Given 8/31/24 1055)   sodium chloride 0.9 % bolus 1,000 mL (0 mL Intravenous Stopped 8/31/24 1206)       Diagnostic  Studies  Results Reviewed       Procedure Component Value Units Date/Time    TSH, 3rd generation with Free T4 reflex [[id number]]  (Normal) Collected: 08/31/24 1109    Lab Status: Final result Specimen: Blood from Arm, Right Updated: 08/31/24 1149     TSH 3RD GENERATON 1.725 uIU/mL     hCG, qualitative pregnancy [[id number]]  (Normal) Collected: 08/31/24 1109    Lab Status: Final result Specimen: Blood from Arm, Right Updated: 08/31/24 1145     Preg, Serum Negative    HS Troponin 0hr (reflex protocol) [[id number]]  (Normal) Collected: 08/31/24 1109    Lab Status: Final result Specimen: Blood from Arm, Right Updated: 08/31/24 1140     hs TnI 0hr 3 ng/L     Basic metabolic panel [[id number]] Collected: 08/31/24 1109    Lab Status: Final result Specimen: Blood from Arm, Right Updated: 08/31/24 1137     Sodium 141 mmol/L      Potassium 3.6 mmol/L      Chloride 107 mmol/L      CO2 23 mmol/L      ANION GAP 11 mmol/L      BUN 14 mg/dL      Creatinine 0.82 mg/dL      Glucose 111 mg/dL      Calcium 9.2 mg/dL      eGFR --    Narrative:      Notes:     1. eGFR calculation is only valid for adults 18 years and older.  2. EGFR calculation cannot be performed for patients who are transgender, non-binary, or whose legal sex, sex at birth, and gender identity differ.    CBC and differential [[id number]] Collected: 08/31/24 1109    Lab Status: Final result Specimen: Blood from Arm, Right Updated: 08/31/24 1118     WBC 7.58 Thousand/uL      RBC 4.07 Million/uL      Hemoglobin 13.6 g/dL      Hematocrit 39.6 %      MCV 97 fL      MCH 33.4 pg      MCHC 34.3 g/dL      RDW 12.7 %      MPV 10.0 fL      Platelets 278 Thousands/uL      nRBC 0 /100 WBCs      Segmented % 58 %      Immature Grans % 1 %      Lymphocytes % 31 %      Monocytes % 8 %      Eosinophils Relative 1 %      Basophils Relative 1 %      Absolute Neutrophils 4.47 Thousands/µL      Absolute Immature Grans 0.04 Thousand/uL      Absolute Lymphocytes 2.33 Thousands/µL      Absolute  Monocytes 0.59 Thousand/µL      Eosinophils Absolute 0.10 Thousand/µL      Basophils Absolute 0.05 Thousands/µL                    No orders to display              Procedures  CriticalCare Time    Date/Time: 8/31/2024 3:59 PM    Performed by: Michel Epps DO  Authorized by: Michel Epps DO    Critical care provider statement:     Critical care time (minutes):  31    Critical care time was exclusive of:  Separately billable procedures and treating other patients and teaching time    Critical care was necessary to treat or prevent imminent or life-threatening deterioration of the following conditions:  Cardiac failure    Critical care was time spent personally by me on the following activities:  Blood draw for specimens, obtaining history from patient or surrogate, development of treatment plan with patient or surrogate, discussions with primary provider, evaluation of patient's response to treatment, examination of patient, ordering and performing treatments and interventions, ordering and review of laboratory studies, ordering and review of radiographic studies, re-evaluation of patient's condition and review of old charts    I assumed direction of critical care for this patient from another provider in my specialty: no             ED Course                                 SBIRT 22yo+      Flowsheet Row Most Recent Value   Initial Alcohol Screen: US AUDIT-C     1. How often do you have a drink containing alcohol? 0 Filed at: 08/31/2024 1049   2. How many drinks containing alcohol do you have on a typical day you are drinking?  0 Filed at: 08/31/2024 1049   3a. Male UNDER 65: How often do you have five or more drinks on one occasion? 0 Filed at: 08/31/2024 1049   3b. FEMALE Any Age, or MALE 65+: How often do you have 4 or more drinks on one occassion? 0 Filed at: 08/31/2024 1049   Audit-C Score 0 Filed at: 08/31/2024 1049   [place]: How many times in the past year have you...    Used an illegal drug or used a  prescription medication for non-medical reasons? Never Filed at: 08/31/2024 1049                      Medical Decision Making  44-year-old female presenting in SVT.  Obtain cardiopulmonary evaluation, pregnancy test, TSH.  Reviewed prior records and patient received adenosine and Lopressor previously.  Patient requesting to not receive adenosine given side effects.  Will administer Cardizem.    Patient converted to normal sinus rhythm almost immediately after IV bolus of Cardizem.  Patient monitored in ED and remains asymptomatic.  Discussed all results.  Will follow-up with her cardiologist at [facility].    Amount and/or Complexity of Data Reviewed  Labs: ordered.    Risk  Prescription drug management.                 Disposition  Final diagnoses:   SVT (supraventricular tachycardia)     Time reflects when diagnosis was documented in both MDM as applicable and the Disposition within this note       Time User Action Codes Description Comment    8/31/2024 12:04 PM Michel Epps Add [I47.10] SVT (supraventricular tachycardia)           ED Disposition       ED Disposition   Discharge    Condition   Stable    Date/Time   Sat Aug 31, 2024 1204    Comment   Ralph Johnson discharge to home/self care.                   Follow-up Information       Follow up With Specialties Details Why Contact Info Additional Information    Gavin Pérez MD Family Medicine   [address] [place] [id number]  [phone]       [facility] Cardiology [facility]   [address]  [facility] [address]  [phone] [facility] Cardiology [facility] [address], [place], [address]   [phone]     [facility] Emergency Department Emergency Medicine  If symptoms worsen [address] [address]  [phone] [facility] Emergency Department, [address]  [place] [address]            Discharge Medication List as of 8/31/2024 12:05 PM        CONTINUE these medications which have NOT CHANGED    Details   cyclobenzaprine (FLEXERIL) 5 mg tablet Take 1 tablet (5 mg total) by mouth 3 (three) times a day as needed for muscle spasms, Starting Fri 2/8/2019, Normal      Elagolix Sodium (Orilissa) 150 MG TABS Take by mouth One daily, Historical Med      metoprolol succinate (TOPROL-XL) 25 mg 24 hr tablet Take 1 tablet (25 mg total) by mouth daily, Starting Fri 9/22/2023, Normal      NON FORMULARY Medical Marijuana, Historical Med      Sod Picosulfate-Mag Ox-Cit Acd (Clenpiq) 10-3.5-12 MG-GM -GM/160ML SOLN As directed (1 kit), Normal             No discharge procedures on file.    PDMP Review       None            ED Provider  Electronically Signed by             Michel Epps DO  08/31/24 1600

## 2025-03-25 ENCOUNTER — HOSPITAL ENCOUNTER (EMERGENCY)
Facility: HOSPITAL | Age: 45
Discharge: HOME | End: 2025-03-25
Attending: EMERGENCY MEDICINE
Payer: COMMERCIAL

## 2025-03-25 VITALS
HEART RATE: 67 BPM | HEIGHT: 66 IN | DIASTOLIC BLOOD PRESSURE: 65 MMHG | RESPIRATION RATE: 20 BRPM | SYSTOLIC BLOOD PRESSURE: 119 MMHG | BODY MASS INDEX: 28.93 KG/M2 | WEIGHT: 180 LBS | OXYGEN SATURATION: 98 % | TEMPERATURE: 98.2 F

## 2025-03-25 DIAGNOSIS — R55 NEAR SYNCOPE: Primary | ICD-10-CM

## 2025-03-25 LAB
ALBUMIN SERPL BCG-MCNC: 4.4 G/DL (ref 3.5–5)
ALP SERPL-CCNC: 34 U/L (ref 34–104)
ALT SERPL W P-5'-P-CCNC: 11 U/L (ref 7–52)
ANION GAP SERPL CALCULATED.3IONS-SCNC: 4 MMOL/L (ref 4–13)
AST SERPL W P-5'-P-CCNC: 16 U/L (ref 13–39)
BASOPHILS # BLD AUTO: 0.05 THOUSANDS/ÂΜL (ref 0–0.1)
BASOPHILS NFR BLD AUTO: 1 % (ref 0–1)
BILIRUB SERPL-MCNC: 0.25 MG/DL (ref 0.2–1)
BUN SERPL-MCNC: 16 MG/DL (ref 5–25)
CALCIUM SERPL-MCNC: 9.1 MG/DL (ref 8.4–10.2)
CHLORIDE SERPL-SCNC: 106 MMOL/L (ref 96–108)
CO2 SERPL-SCNC: 27 MMOL/L (ref 21–32)
CREAT SERPL-MCNC: 0.81 MG/DL (ref 0.6–1.3)
EOSINOPHIL # BLD AUTO: 0.14 THOUSAND/ÂΜL (ref 0–0.61)
EOSINOPHIL NFR BLD AUTO: 2 % (ref 0–6)
ERYTHROCYTE [DISTWIDTH] IN BLOOD BY AUTOMATED COUNT: 13.2 % (ref 11.6–15.1)
EXT PREGNANCY TEST URINE: NEGATIVE
EXT. CONTROL: NORMAL
GFR SERPL CREATININE-BSD FRML MDRD: 87 ML/MIN/1.73SQ M
GLUCOSE SERPL-MCNC: 98 MG/DL (ref 65–140)
HCT VFR BLD AUTO: 33.3 % (ref 34.8–46.1)
HGB BLD-MCNC: 11.1 G/DL (ref 11.5–15.4)
HOLD SPECIMEN: NORMAL
IMM GRANULOCYTES # BLD AUTO: 0.01 THOUSAND/UL (ref 0–0.2)
IMM GRANULOCYTES NFR BLD AUTO: 0 % (ref 0–2)
LIPASE SERPL-CCNC: 37 U/L (ref 11–82)
LYMPHOCYTES # BLD AUTO: 2.7 THOUSANDS/ÂΜL (ref 0.6–4.47)
LYMPHOCYTES NFR BLD AUTO: 42 % (ref 14–44)
MCH RBC QN AUTO: 33.2 PG (ref 26.8–34.3)
MCHC RBC AUTO-ENTMCNC: 33.3 G/DL (ref 31.4–37.4)
MCV RBC AUTO: 100 FL (ref 82–98)
MONOCYTES # BLD AUTO: 0.47 THOUSAND/ÂΜL (ref 0.17–1.22)
MONOCYTES NFR BLD AUTO: 7 % (ref 4–12)
NEUTROPHILS # BLD AUTO: 3.03 THOUSANDS/ÂΜL (ref 1.85–7.62)
NEUTS SEG NFR BLD AUTO: 48 % (ref 43–75)
NRBC BLD AUTO-RTO: 0 /100 WBCS
PLATELET # BLD AUTO: 255 THOUSANDS/UL (ref 149–390)
PMV BLD AUTO: 10.3 FL (ref 8.9–12.7)
POTASSIUM SERPL-SCNC: 3.7 MMOL/L (ref 3.5–5.3)
PROT SERPL-MCNC: 7.1 G/DL (ref 6.4–8.4)
RBC # BLD AUTO: 3.34 MILLION/UL (ref 3.81–5.12)
SODIUM SERPL-SCNC: 137 MMOL/L (ref 135–147)
WBC # BLD AUTO: 6.4 THOUSAND/UL (ref 4.31–10.16)

## 2025-03-25 PROCEDURE — 36415 COLL VENOUS BLD VENIPUNCTURE: CPT

## 2025-03-25 PROCEDURE — 80053 COMPREHEN METABOLIC PANEL: CPT

## 2025-03-25 PROCEDURE — 99285 EMERGENCY DEPT VISIT HI MDM: CPT | Performed by: EMERGENCY MEDICINE

## 2025-03-25 PROCEDURE — 85025 COMPLETE CBC W/AUTO DIFF WBC: CPT

## 2025-03-25 PROCEDURE — 81025 URINE PREGNANCY TEST: CPT

## 2025-03-25 PROCEDURE — 83690 ASSAY OF LIPASE: CPT

## 2025-03-25 PROCEDURE — 93005 ELECTROCARDIOGRAM TRACING: CPT

## 2025-03-25 PROCEDURE — 99284 EMERGENCY DEPT VISIT MOD MDM: CPT

## 2025-03-26 NOTE — DISCHARGE INSTRUCTIONS
You have been seen for lightheadedness. Return to the emergency department if you develop worsening syncope, chest pain, trouble breathing, worsening bleeding or any other symptoms of concern. Please follow up with your PCP by calling the number provided.

## 2025-03-26 NOTE — ED PROVIDER NOTES
Time reflects when diagnosis was documented in both MDM as applicable and the Disposition within this note       Time User Action Codes Description Comment    3/25/2025 10:11 PM Gavin Warren Add [R55] Near syncope           ED Disposition       ED Disposition   Discharge    Condition   Stable    Date/Time   Tue Mar 25, 2025 10:12 PM    Comment   Ralph Johnson discharge to home/self care.                   Assessment & Plan       Medical Decision Making    45 y.o. female presenting for evaluation after a syncopal episode.  Will obtain labs to evaluate for SIRS, anemia, electrolyte abnormality or JULIANO.  Will obtain EKG to evaluate for arrhythmia.  No signs/symptoms of VTE.  Patient has history of paroxysmal arrhythmia and near syncopal episodes previously.    Reassessment: VSS, resting comfortably and asymptomatic.  Ambulatory in ED without issue.  Reviewed lab results with patient and .  Discussed alternative etiology to include vasovagal episode vs. Paroxysmal arrhythmia.    Disposition: I have discussed with the patient our plan to discharge them from the ED and the patient is in agreement with this plan.     Discharge Plan: encouraged oral hydration and outpatient f/u with PCP and/or cardiology. RTED precautions emphasized. The patient was provided a written after visit summary with strict RTED precautions.     Followup: I have discussed with the patient plan to follow up with their PCP. Contact information provided in AVS.    Amount and/or Complexity of Data Reviewed  Labs:  Decision-making details documented in ED Course.        ED Course as of 03/25/25 2351   Tue Mar 25, 2025   2041 Procedure Note: EKG  Date/Time: 03/25/25 8:41 PM   Interpreted by: Gavin Warren DO  Indications / Diagnosis: Syncope  ECG reviewed by me, the ED Provider: yes   The EKG demonstrates:  Rhythm: normal sinus rhythm 66 BPM  Intervals: Normal [place] and QT intervals  Axis: Normal axis  QRS/Blocks: Normal QRS  ST Changes: No  acute ST/T waves changes. No GARY. No TWI.   2150 Hemoglobin(!): 11.1       Medications - No data to display    ED Risk Strat Scores                            SBIRT 20yo+      Flowsheet Row Most Recent Value   Initial Alcohol Screen: US AUDIT-C     1. How often do you have a drink containing alcohol? 0 Filed at: 2025   2. How many drinks containing alcohol do you have on a typical day you are drinking?  0 Filed at: 2025   3a. Male UNDER 65: How often do you have five or more drinks on one occasion? 0 Filed at: 2025   3b. FEMALE Any Age, or MALE 65+: How often do you have 4 or more drinks on one occassion? 0 Filed at: 2025   Audit-C Score 0 Filed at: 2025   [place]: How many times in the past year have you...    Used an illegal drug or used a prescription medication for non-medical reasons? Never Filed at: 2025                            History of Present Illness       Chief Complaint   Patient presents with    Syncope     Pt has hx of dizziness and an ablation. Pt states she had a syncopal episode today about 30 minutes ago, stated she lowered herself and remembers feeling it coming on. No headstrike, no bloodthinneres. Pt has hx of episodes like this but states this was the worst. Pt has her menstrual cycle right now and states she is bleeding through tampons almost every hour.        Past Medical History:   Diagnosis Date    Anxiety     Endometriosis     PTSD (post-traumatic stress disorder)     SVT (supraventricular tachycardia) ([facility])       Past Surgical History:   Procedure Laterality Date    CARDIAC ELECTROPHYSIOLOGY MAPPING AND ABLATION  10/21/2024     SECTION      LAPAROSCOPY      TUBAL LIGATION        Family History   Problem Relation Age of Onset    Hypertension Mother     Colon cancer Neg Hx     Colon polyps Neg Hx     Inflammatory bowel disease Neg Hx     Celiac disease Neg Hx       Social History     Tobacco Use    Smoking status:  Never    Smokeless tobacco: Never   Vaping Use    Vaping status: Never Used   Substance Use Topics    Drug use: Yes     Types: Marijuana     Comment: medical marijuana      E-Cigarette/Vaping    E-Cigarette Use Never User       E-Cigarette/Vaping Substances    Nicotine No     THC No     CBD No     Flavoring No     Other No     Unknown No       I have reviewed and agree with the history as documented.     Ralph Johnson is a 45 y.o. year old female with PMH of PSVT presenting to the [facility] ED for evaluation after a near syncopal episode. Patient has had abdominal cramping and vaginal bleeding attributed to her period for the past 3 days.  Earlier today she was cleaning her house when she became lightheaded and weak.  She slumped over however does not believe she had loss of consciousness.  She was able to walk to her  and had brisk return to normal status.  She denies associated chest pain, palpitations or shortness of breath.  No leg pain or swelling.  No recent fevers or URI symptoms.  No abdominal pain or nausea/vomiting/diarrhea. Patient has history of PSVT for which she was previously followed by [facility] cardiology and underwent ablation procedure.      History provided by:  Medical records, patient and spouse   used: No    Syncope  Associated symptoms: no chest pain, no dizziness, no fever, no nausea, no shortness of breath and no vomiting        Review of Systems   Constitutional:  Negative for chills and fever.   HENT:  Negative for congestion.    Respiratory:  Negative for cough and shortness of breath.    Cardiovascular:  Positive for syncope. Negative for chest pain and leg swelling.   Gastrointestinal:  Negative for abdominal pain, diarrhea, nausea and vomiting.   Genitourinary:  Positive for vaginal bleeding. Negative for dysuria and flank pain.   Musculoskeletal:  Negative for back pain.   Neurological:  Positive for light-headedness. Negative for dizziness and syncope.   All  other systems reviewed and are negative.          Objective       ED Triage Vitals   Temperature Pulse Blood Pressure Respirations SpO2 Patient Position - Orthostatic VS   03/25/25 2047 03/25/25 2040 03/25/25 2040 03/25/25 2040 03/25/25 2040 03/25/25 2040   98.2 °F (36.8 °C) 67 119/65 20 98 % Sitting      Temp Source Heart Rate Source BP Location FiO2 (%) Pain Score    03/25/25 2047 03/25/25 2040 03/25/25 2040 -- --    Temporal Monitor Right arm        Vitals      Date and Time Temp Pulse SpO2 Resp BP Pain Score FACES Pain Rating User   03/25/25 2047 98.2 °F (36.8 °C) -- -- -- -- -- -- KK   03/25/25 2044 -- -- 98 % -- -- -- -- KK   03/25/25 2040 -- 67 98 % 20 119/65 -- -- KK            Physical Exam  Vitals and nursing note reviewed.   Constitutional:       General: She is not in acute distress.     Appearance: Normal appearance. She is well-developed. She is not ill-appearing, toxic-appearing or diaphoretic.   HENT:      Head: Normocephalic and atraumatic.      Nose: No congestion or rhinorrhea.   Eyes:      General:         Right eye: No discharge.         Left eye: No discharge.   Cardiovascular:      Rate and Rhythm: Normal rate and regular rhythm.      Heart sounds: Heart sounds not distant. No murmur heard.     No friction rub.   Pulmonary:      Effort: Pulmonary effort is normal. No accessory muscle usage or respiratory distress.      Breath sounds: Normal breath sounds. No stridor. No decreased breath sounds, wheezing, rhonchi or rales.   Abdominal:      General: There is no distension.      Palpations: Abdomen is soft.      Tenderness: There is no abdominal tenderness. There is no guarding or rebound.   Musculoskeletal:      Cervical back: Normal range of motion and neck supple. No rigidity.      Right lower leg: No tenderness. No edema.      Left lower leg: No tenderness. No edema.   Skin:     Capillary Refill: Capillary refill takes less than 2 seconds.      Coloration: Skin is not pale.   Neurological:       Mental Status: She is alert and oriented to person, place, and time.      GCS: GCS eye subscore is 4. GCS verbal subscore is 5. GCS motor subscore is 6.   Psychiatric:         Mood and Affect: Mood normal.         Behavior: Behavior normal.         Results Reviewed       Procedure Component Value Units Date/Time    POCT pregnancy, urine [[id number]]  (Normal) Collected: 03/25/25 2217    Lab Status: Final result Updated: 03/25/25 2217     EXT Preg Test, Ur Negative     Control Valid    Lipase [[id number]]  (Normal) Collected: 03/25/25 2047    Lab Status: Final result Specimen: Blood from Arm, Right Updated: 03/25/25 2122     Lipase 37 u/L     Comprehensive metabolic panel [[id number]] Collected: 03/25/25 2047    Lab Status: Final result Specimen: Blood from Arm, Right Updated: 03/25/25 2112     Sodium 137 mmol/L      Potassium 3.7 mmol/L      Chloride 106 mmol/L      CO2 27 mmol/L      ANION GAP 4 mmol/L      BUN 16 mg/dL      Creatinine 0.81 mg/dL      Glucose 98 mg/dL      Calcium 9.1 mg/dL      AST 16 U/L      ALT 11 U/L      Alkaline Phosphatase 34 U/L      Total Protein 7.1 g/dL      Albumin 4.4 g/dL      Total Bilirubin 0.25 mg/dL      eGFR 87 ml/min/1.73sq m     Narrative:      National Kidney Disease Foundation guidelines for Chronic Kidney Disease (CKD):     Stage 1 with normal or high GFR (GFR > 90 mL/min/1.73 square meters)    Stage 2 Mild CKD (GFR = 60-89 mL/min/1.73 square meters)    Stage 3A Moderate CKD (GFR = 45-59 mL/min/1.73 square meters)    Stage 3B Moderate CKD (GFR = 30-44 mL/min/1.73 square meters)    Stage 4 Severe CKD (GFR = 15-29 mL/min/1.73 square meters)    Stage 5 End Stage CKD (GFR <15 mL/min/1.73 square meters)  Note: GFR calculation is accurate only with a steady state creatinine    [place] draw [[id number]] Collected: 03/25/25 2047    Lab Status: Final result Specimen: Blood from Arm, Right Updated: 03/25/25 2111    Narrative:      The following orders were created for panel order  [place] draw.  Procedure                               Abnormality         Status                     ---------                               -----------         ------                     Light Blue Top on hold[[id number]]                           Final result               Gold top on hold[[id number]]                                 Final result               Green / Black tube on hold[[id number]]                       Final result                 Please view results for these tests on the individual orders.    CBC and differential [[id number]]  (Abnormal) Collected: 03/25/25 2047    Lab Status: Final result Specimen: Blood from Arm, Right Updated: 03/25/25 2055     WBC 6.40 Thousand/uL      RBC 3.34 Million/uL      Hemoglobin 11.1 g/dL      Hematocrit 33.3 %       fL      MCH 33.2 pg      MCHC 33.3 g/dL      RDW 13.2 %      MPV 10.3 fL      Platelets 255 Thousands/uL      nRBC 0 /100 WBCs      Segmented % 48 %      Immature Grans % 0 %      Lymphocytes % 42 %      Monocytes % 7 %      Eosinophils Relative 2 %      Basophils Relative 1 %      Absolute Neutrophils 3.03 Thousands/µL      Absolute Immature Grans 0.01 Thousand/uL      Absolute Lymphocytes 2.70 Thousands/µL      Absolute Monocytes 0.47 Thousand/µL      Eosinophils Absolute 0.14 Thousand/µL      Basophils Absolute 0.05 Thousands/µL             No orders to display       Procedures    ED Medication and Procedure Management   Prior to Admission Medications   Prescriptions Last Dose Informant Patient Reported? Taking?   Elagolix Sodium (Orilissa) 150 MG TABS   Yes No   Sig: Take by mouth One daily   NON FORMULARY  Self Yes No   Sig: Medical Marijuana   Sod Picosulfate-Mag Ox-Cit Acd (Clenpiq) 10-3.5-12 MG-GM -GM/160ML SOLN   No No   Sig: As directed (1 kit)   Patient not taking: Reported on 1/27/2023   cyclobenzaprine (FLEXERIL) 5 mg tablet  Self No No   Sig: Take 1 tablet (5 mg total) by mouth 3 (three) times a day as needed for muscle spasms    Patient not taking: Reported on 3/22/2022    metoprolol succinate (TOPROL-XL) 25 mg 24 hr tablet   No No   Sig: Take 1 tablet (25 mg total) by mouth daily      Facility-Administered Medications: None     Discharge Medication List as of 3/25/2025 10:12 PM        CONTINUE these medications which have NOT CHANGED    Details   cyclobenzaprine (FLEXERIL) 5 mg tablet Take 1 tablet (5 mg total) by mouth 3 (three) times a day as needed for muscle spasms, Starting Fri 2/8/2019, Normal      Elagolix Sodium (Orilissa) 150 MG TABS Take by mouth One daily, Historical Med      metoprolol succinate (TOPROL-XL) 25 mg 24 hr tablet Take 1 tablet (25 mg total) by mouth daily, Starting Fri 9/22/2023, Normal      NON FORMULARY Medical Marijuana, Historical Med      Sod Picosulfate-Mag Ox-Cit Acd (Clenpiq) 10-3.5-12 MG-GM -GM/160ML SOLN As directed (1 kit), Normal           No discharge procedures on file.  ED SEPSIS DOCUMENTATION   Time reflects when diagnosis was documented in both MDM as applicable and the Disposition within this note       Time User Action Codes Description Comment    3/25/2025 10:11 PM Gavin Warren Add [R55] Near syncope                  Gavin Warren, DO  03/25/25 7733

## 2025-03-27 LAB
ATRIAL RATE: 66 BPM
P AXIS: 67 DEGREES
PR INTERVAL: 154 MS
QRS AXIS: 66 DEGREES
QRSD INTERVAL: 78 MS
QT INTERVAL: 396 MS
QTC INTERVAL: 415 MS
T WAVE AXIS: 73 DEGREES
VENTRICULAR RATE: 66 BPM

## 2025-03-27 PROCEDURE — 93010 ELECTROCARDIOGRAM REPORT: CPT | Performed by: INTERNAL MEDICINE

## 2025-05-21 ENCOUNTER — OFFICE VISIT (OUTPATIENT)
Dept: AUDIOLOGY | Age: 45
End: 2025-05-21
Payer: COMMERCIAL

## 2025-05-21 DIAGNOSIS — R42 DIZZINESS AND GIDDINESS: Primary | ICD-10-CM

## 2025-05-21 PROCEDURE — 92537 CALORIC VSTBLR TEST W/REC: CPT | Performed by: AUDIOLOGIST

## 2025-05-21 PROCEDURE — 92540 BASIC VESTIBULAR EVALUATION: CPT | Performed by: AUDIOLOGIST

## 2025-05-21 PROCEDURE — 92567 TYMPANOMETRY: CPT | Performed by: AUDIOLOGIST

## 2025-05-21 NOTE — PROGRESS NOTES
"Videonystagmography (VNG) Evaluation    Name:  Ralph Johnson  :  1980  Age:  45 y.o.  MRN:  [id number]  Date of Evaluation: 25     HISTORY:     Reason for visit: Dizziness    Ralph Johnson is seen today at the request of Dr. Boo for VNG testing. Ralph was unaccompanied to today's visit. Today, Ralph reported that onset of symptoms began over a year ago when she began experiencing episodes of lightheadedness and a perception that her environment was shifting. The current symptoms are described as intermittent in frequency. Dizziness perception is described as a(n) imbalanced sensation. Associated symptoms include ocular floaters/\"wisps of smoke\".  Symptom duration was noted to typically last minutes to hours before subsiding. Episode frequency has decreased substantially since the patient began attending physical/vestibular  therapy. The patient reports the use of medical marijuana last night.    EVALUATION:    Otoscopic Evaluation:   Right Ear: Unremarkable, canal clear   Left Ear: Unremarkable, canal clear    Tympanometry:   Right: Type A; normal middle ear pressure and static compliance    Left: Type A; normal middle ear pressure and static compliance     Oculomotor battery:   Gaze:   Right: Within normal limits  Left: Within normal limits  Up: Within normal limits  Down: Within normal limits      Tracking: Within normal limits    Saccades: Within normal limits     Optokinetic: Within normal limits    Positioning/Positionals:     Solange [place]:    Right: Negative. Few beats of 3 degrees downbeating nystagmus with head down. No reported dizziness.    Left: Negative. Report of brief dizziness with sitting up.        Positionals:   Sitting: Within normal limits   Supine:  Within normal limits  Head Right: 3 degrees downbeating nystagmus, suppressed with fixation.   Head Left: 2 degrees leftbeating nystagmus, suppressed with fixation. Not clinically significant.   Body Right: 2 degrees " downbeating nystagmus, suppressed with fixation.   Body Left: Within normal limits   30 degrees Supine:  Within normal limits       Calorics: (Normal response <25% difference)    Monothermal Caloric Irrigation: Symmetrical and robust cool calorics      Caloric irrigations  completed without incident with good parting otoscopy noted.       IMPRESSIONS:     Symmetrical and robust cool calorics . Left and right sides of the vestibular system are responding appropriately to stimuli, indicating normal peripheral vestibular function. Warm calorics were not performed due to significant nausea experienced by patient in response to cool calorics.    Low SPV downbeating nystagmus during head right, body right, and right Solange may be due to ingestion of marijuana in past 12 to 24 hours.    RECOMMENDATIONS:     1) Follow-up with referring provider to review today's results.  2) Continue to monitor dizziness symptoms. If symptoms worsen or fail to improve prior to follow-up with their referring provider, contact your primary care/or referring provider and/or urgent medical attention should be considered.  3) Fall precautions were discussed at length with the patient. Most test effects are expected to subside shortly after testing is completed, it was recommended that they use caution moving around for the remainder of the day.         Karen Carpenter., CCC-A  Clinical Audiologist  [facility]  [address] [id number]